# Patient Record
Sex: FEMALE | Race: ASIAN | Employment: UNEMPLOYED | URBAN - METROPOLITAN AREA
[De-identification: names, ages, dates, MRNs, and addresses within clinical notes are randomized per-mention and may not be internally consistent; named-entity substitution may affect disease eponyms.]

---

## 2017-05-04 ENCOUNTER — TELEPHONE (OUTPATIENT)
Dept: FAMILY MEDICINE CLINIC | Facility: CLINIC | Age: 52
End: 2017-05-04

## 2017-05-04 NOTE — TELEPHONE ENCOUNTER
Lm for patient to verify if will still be usif Dr. Sonya Madsen as her PCP. But disregard if son calls back, apparently pt lives in [facility].  And was only seen here when visiting